# Patient Record
Sex: MALE | Race: BLACK OR AFRICAN AMERICAN | NOT HISPANIC OR LATINO | URBAN - METROPOLITAN AREA
[De-identification: names, ages, dates, MRNs, and addresses within clinical notes are randomized per-mention and may not be internally consistent; named-entity substitution may affect disease eponyms.]

---

## 2018-08-28 ENCOUNTER — EMERGENCY (EMERGENCY)
Facility: HOSPITAL | Age: 48
LOS: 1 days | End: 2018-08-28
Payer: SELF-PAY

## 2018-08-28 PROCEDURE — 99284 EMERGENCY DEPT VISIT MOD MDM: CPT

## 2021-04-20 ENCOUNTER — APPOINTMENT (OUTPATIENT)
Dept: ULTRASOUND IMAGING | Facility: CLINIC | Age: 51
End: 2021-04-20
Payer: MEDICAID

## 2021-04-20 PROCEDURE — 76870 US EXAM SCROTUM: CPT

## 2022-11-17 ENCOUNTER — INPATIENT (INPATIENT)
Facility: HOSPITAL | Age: 52
LOS: 0 days | Discharge: HOME | DRG: 72 | End: 2022-11-17
Attending: STUDENT IN AN ORGANIZED HEALTH CARE EDUCATION/TRAINING PROGRAM | Admitting: STUDENT IN AN ORGANIZED HEALTH CARE EDUCATION/TRAINING PROGRAM
Payer: MEDICAID

## 2022-11-17 VITALS
HEART RATE: 98 BPM | SYSTOLIC BLOOD PRESSURE: 160 MMHG | OXYGEN SATURATION: 97 % | DIASTOLIC BLOOD PRESSURE: 84 MMHG | RESPIRATION RATE: 18 BRPM | TEMPERATURE: 99 F

## 2022-11-17 VITALS
TEMPERATURE: 99 F | WEIGHT: 184.97 LBS | SYSTOLIC BLOOD PRESSURE: 159 MMHG | RESPIRATION RATE: 20 BRPM | HEART RATE: 103 BPM | OXYGEN SATURATION: 99 % | DIASTOLIC BLOOD PRESSURE: 60 MMHG

## 2022-11-17 DIAGNOSIS — R56.9 UNSPECIFIED CONVULSIONS: ICD-10-CM

## 2022-11-17 LAB
ALBUMIN SERPL ELPH-MCNC: 4.3 G/DL — SIGNIFICANT CHANGE UP (ref 3.3–5.2)
ALP SERPL-CCNC: 87 U/L — SIGNIFICANT CHANGE UP (ref 40–120)
ALT FLD-CCNC: 147 U/L — HIGH
ANION GAP SERPL CALC-SCNC: 19 MMOL/L — HIGH (ref 5–17)
ANISOCYTOSIS BLD QL: SLIGHT — SIGNIFICANT CHANGE UP
APTT BLD: 32 SEC — SIGNIFICANT CHANGE UP (ref 27.5–35.5)
AST SERPL-CCNC: 259 U/L — HIGH
BASOPHILS # BLD AUTO: 0.16 K/UL — SIGNIFICANT CHANGE UP (ref 0–0.2)
BASOPHILS NFR BLD AUTO: 2.6 % — HIGH (ref 0–2)
BILIRUB SERPL-MCNC: 0.6 MG/DL — SIGNIFICANT CHANGE UP (ref 0.4–2)
BLD GP AB SCN SERPL QL: SIGNIFICANT CHANGE UP
BUN SERPL-MCNC: 7.9 MG/DL — LOW (ref 8–20)
CALCIUM SERPL-MCNC: 9.1 MG/DL — SIGNIFICANT CHANGE UP (ref 8.4–10.5)
CHLORIDE SERPL-SCNC: 96 MMOL/L — SIGNIFICANT CHANGE UP (ref 96–108)
CO2 SERPL-SCNC: 23 MMOL/L — SIGNIFICANT CHANGE UP (ref 22–29)
CREAT SERPL-MCNC: 0.56 MG/DL — SIGNIFICANT CHANGE UP (ref 0.5–1.3)
EGFR: 119 ML/MIN/1.73M2 — SIGNIFICANT CHANGE UP
EOSINOPHIL # BLD AUTO: 0 K/UL — SIGNIFICANT CHANGE UP (ref 0–0.5)
EOSINOPHIL NFR BLD AUTO: 0 % — SIGNIFICANT CHANGE UP (ref 0–6)
FLUAV H3 RNA SPEC QL NAA+PROBE: DETECTED
FLUAV SUBTYP SPEC NAA+PROBE: DETECTED
GIANT PLATELETS BLD QL SMEAR: PRESENT — SIGNIFICANT CHANGE UP
GLUCOSE SERPL-MCNC: 97 MG/DL — SIGNIFICANT CHANGE UP (ref 70–99)
HCT VFR BLD CALC: 41.9 % — SIGNIFICANT CHANGE UP (ref 39–50)
HGB BLD-MCNC: 14.6 G/DL — SIGNIFICANT CHANGE UP (ref 13–17)
INR BLD: 1.32 RATIO — HIGH (ref 0.88–1.16)
LYMPHOCYTES # BLD AUTO: 0.49 K/UL — LOW (ref 1–3.3)
LYMPHOCYTES # BLD AUTO: 7.8 % — LOW (ref 13–44)
MACROCYTES BLD QL: SLIGHT — SIGNIFICANT CHANGE UP
MANUAL SMEAR VERIFICATION: SIGNIFICANT CHANGE UP
MCHC RBC-ENTMCNC: 32.2 PG — SIGNIFICANT CHANGE UP (ref 27–34)
MCHC RBC-ENTMCNC: 34.8 GM/DL — SIGNIFICANT CHANGE UP (ref 32–36)
MCV RBC AUTO: 92.5 FL — SIGNIFICANT CHANGE UP (ref 80–100)
MONOCYTES # BLD AUTO: 1.31 K/UL — HIGH (ref 0–0.9)
MONOCYTES NFR BLD AUTO: 20.9 % — HIGH (ref 2–14)
NEUTROPHILS # BLD AUTO: 3.87 K/UL — SIGNIFICANT CHANGE UP (ref 1.8–7.4)
NEUTROPHILS NFR BLD AUTO: 60 % — SIGNIFICANT CHANGE UP (ref 43–77)
NEUTS BAND # BLD: 1.7 % — SIGNIFICANT CHANGE UP (ref 0–8)
PLAT MORPH BLD: NORMAL — SIGNIFICANT CHANGE UP
PLATELET # BLD AUTO: 183 K/UL — SIGNIFICANT CHANGE UP (ref 150–400)
POTASSIUM SERPL-MCNC: 3.7 MMOL/L — SIGNIFICANT CHANGE UP (ref 3.5–5.3)
POTASSIUM SERPL-SCNC: 3.7 MMOL/L — SIGNIFICANT CHANGE UP (ref 3.5–5.3)
PROT SERPL-MCNC: 8.2 G/DL — SIGNIFICANT CHANGE UP (ref 6.6–8.7)
PROTHROM AB SERPL-ACNC: 15.3 SEC — HIGH (ref 10.5–13.4)
RAPID RVP RESULT: DETECTED
RBC # BLD: 4.53 M/UL — SIGNIFICANT CHANGE UP (ref 4.2–5.8)
RBC # FLD: 11.4 % — SIGNIFICANT CHANGE UP (ref 10.3–14.5)
RBC BLD AUTO: ABNORMAL
SARS-COV-2 RNA SPEC QL NAA+PROBE: SIGNIFICANT CHANGE UP
SMUDGE CELLS # BLD: PRESENT — SIGNIFICANT CHANGE UP
SODIUM SERPL-SCNC: 138 MMOL/L — SIGNIFICANT CHANGE UP (ref 135–145)
VARIANT LYMPHS # BLD: 7 % — HIGH (ref 0–6)
WBC # BLD: 6.28 K/UL — SIGNIFICANT CHANGE UP (ref 3.8–10.5)
WBC # FLD AUTO: 6.28 K/UL — SIGNIFICANT CHANGE UP (ref 3.8–10.5)

## 2022-11-17 PROCEDURE — G1004: CPT

## 2022-11-17 PROCEDURE — 70450 CT HEAD/BRAIN W/O DYE: CPT | Mod: MG

## 2022-11-17 PROCEDURE — 93010 ELECTROCARDIOGRAM REPORT: CPT

## 2022-11-17 PROCEDURE — 86900 BLOOD TYPING SEROLOGIC ABO: CPT

## 2022-11-17 PROCEDURE — 93005 ELECTROCARDIOGRAM TRACING: CPT

## 2022-11-17 PROCEDURE — 86901 BLOOD TYPING SEROLOGIC RH(D): CPT

## 2022-11-17 PROCEDURE — 70450 CT HEAD/BRAIN W/O DYE: CPT | Mod: 26,76,MG

## 2022-11-17 PROCEDURE — 80053 COMPREHEN METABOLIC PANEL: CPT

## 2022-11-17 PROCEDURE — 0225U NFCT DS DNA&RNA 21 SARSCOV2: CPT

## 2022-11-17 PROCEDURE — 85610 PROTHROMBIN TIME: CPT

## 2022-11-17 PROCEDURE — 96374 THER/PROPH/DIAG INJ IV PUSH: CPT

## 2022-11-17 PROCEDURE — 95714 VEEG EA 12-26 HR UNMNTR: CPT

## 2022-11-17 PROCEDURE — 99223 1ST HOSP IP/OBS HIGH 75: CPT

## 2022-11-17 PROCEDURE — 99285 EMERGENCY DEPT VISIT HI MDM: CPT

## 2022-11-17 PROCEDURE — 85730 THROMBOPLASTIN TIME PARTIAL: CPT

## 2022-11-17 PROCEDURE — 86850 RBC ANTIBODY SCREEN: CPT

## 2022-11-17 PROCEDURE — 99222 1ST HOSP IP/OBS MODERATE 55: CPT

## 2022-11-17 PROCEDURE — 36415 COLL VENOUS BLD VENIPUNCTURE: CPT

## 2022-11-17 PROCEDURE — 85025 COMPLETE CBC W/AUTO DIFF WBC: CPT

## 2022-11-17 RX ORDER — ACETAMINOPHEN 500 MG
650 TABLET ORAL EVERY 6 HOURS
Refills: 0 | Status: DISCONTINUED | OUTPATIENT
Start: 2022-11-17 | End: 2022-11-17

## 2022-11-17 RX ORDER — ALBUTEROL 90 UG/1
2 AEROSOL, METERED ORAL
Qty: 0 | Refills: 0 | DISCHARGE

## 2022-11-17 RX ORDER — MIDAZOLAM HYDROCHLORIDE 1 MG/ML
5 INJECTION, SOLUTION INTRAMUSCULAR; INTRAVENOUS ONCE
Refills: 0 | Status: DISCONTINUED | OUTPATIENT
Start: 2022-11-17 | End: 2022-11-17

## 2022-11-17 RX ORDER — IPRATROPIUM/ALBUTEROL SULFATE 18-103MCG
3 AEROSOL WITH ADAPTER (GRAM) INHALATION EVERY 6 HOURS
Refills: 0 | Status: DISCONTINUED | OUTPATIENT
Start: 2022-11-17 | End: 2022-11-17

## 2022-11-17 RX ORDER — FOLIC ACID 0.8 MG
1 TABLET ORAL DAILY
Refills: 0 | Status: DISCONTINUED | OUTPATIENT
Start: 2022-11-17 | End: 2022-11-17

## 2022-11-17 RX ORDER — ONDANSETRON 8 MG/1
4 TABLET, FILM COATED ORAL EVERY 8 HOURS
Refills: 0 | Status: DISCONTINUED | OUTPATIENT
Start: 2022-11-17 | End: 2022-11-17

## 2022-11-17 RX ORDER — LEVETIRACETAM 250 MG/1
500 TABLET, FILM COATED ORAL
Refills: 0 | Status: DISCONTINUED | OUTPATIENT
Start: 2022-11-17 | End: 2022-11-17

## 2022-11-17 RX ORDER — BNT162B2 ORIGINAL AND OMICRON BA.4/BA.5 .1125; .1125 MG/2.25ML; MG/2.25ML
0.3 INJECTION, SUSPENSION INTRAMUSCULAR ONCE
Refills: 0 | Status: DISCONTINUED | OUTPATIENT
Start: 2022-11-17 | End: 2022-11-17

## 2022-11-17 RX ORDER — THIAMINE MONONITRATE (VIT B1) 100 MG
100 TABLET ORAL DAILY
Refills: 0 | Status: DISCONTINUED | OUTPATIENT
Start: 2022-11-17 | End: 2022-11-17

## 2022-11-17 RX ORDER — LANOLIN ALCOHOL/MO/W.PET/CERES
3 CREAM (GRAM) TOPICAL AT BEDTIME
Refills: 0 | Status: DISCONTINUED | OUTPATIENT
Start: 2022-11-17 | End: 2022-11-17

## 2022-11-17 RX ADMIN — Medication 1 TABLET(S): at 13:25

## 2022-11-17 RX ADMIN — Medication 100 MILLIGRAM(S): at 13:25

## 2022-11-17 RX ADMIN — Medication 1 MILLIGRAM(S): at 13:25

## 2022-11-17 RX ADMIN — Medication 2 MILLIGRAM(S): at 09:05

## 2022-11-17 RX ADMIN — MIDAZOLAM HYDROCHLORIDE 5 MILLIGRAM(S): 1 INJECTION, SOLUTION INTRAMUSCULAR; INTRAVENOUS at 06:58

## 2022-11-17 NOTE — ED ADULT NURSE REASSESSMENT NOTE - NS ED NURSE REASSESS COMMENT FT1
Report received from offgoing RN, charting as noted. Patient given 2mg ativan for alcohol withdrawal, vitals are stable. pt w/ moderate tremors. report given to [facility] RN.

## 2022-11-17 NOTE — H&P ADULT - HISTORY OF PRESENT ILLNESS
53yo M w/ hx of Asthma and alcohol abuse presents as transfer for brain lesions on CT. Reports he was watching tv with girlfriend tonight, had episode of coughing, "shakes", and unresponsiveness witnessed by the girlfriend, he does not remember it. No h/o seizures. Reports he drinks 4-6 beers/day, more on weekends. Last drink 10pm last night. Denies cigs, drugs. Tremors on arrival to [facility] ED, controlled w/ Versed and ativan. Denies HA, CP, SOB, Abd pain, NVD, fevers, rashes.    FHx: No reported family hx in first degree relatives

## 2022-11-17 NOTE — ED PROVIDER NOTE - CLINICAL SUMMARY MEDICAL DECISION MAKING FREE TEXT BOX
51yo M denies pmh presents as transfer for brain lesions on CT. Labs pending. Neurosurgery consulted.

## 2022-11-17 NOTE — CONSULT NOTE ADULT - SUBJECTIVE AND OBJECTIVE BOX
52y old  Male transferred from [place] for evaluation of intracranial hyperdensity.  Patient reports while watching TV yesterday evening he had episode of coughing / shakes and unresponsiveness witnessed by his girlfriend.  No prior episodes and no hx of seizures disorder.    Patient reports drinking 4-6 beers per night and was drinking last evening as well.        PAST MEDICAL & SURGICAL HISTORY:  None     FAMILY HISTORY:  No hx of seizures or brain lesions     SOCIAL HISTORY:  Tobacco Use: Denies   EtOH use: 4-6 beers per day   Substance: Denies     Allergies    No Known Allergies    Intolerances        REVIEW OF SYSTEMS  Negative except as noted in HPI    Vital Signs Last 24 Hrs  T(C): 37.4 (17 Nov 2022 06:32), Max: 37.4 (17 Nov 2022 06:32)  T(F): 99.3 (17 Nov 2022 06:32), Max: 99.3 (17 Nov 2022 06:32)  HR: 93 (17 Nov 2022 07:18) (93 - 106)  BP: 144/79 (17 Nov 2022 07:18) (144/79 - 169/91)  BP(mean): --  RR: 19 (17 Nov 2022 07:18) (19 - 23)  SpO2: 96% (17 Nov 2022 07:18) (94% - 99%)    Parameters below as of 17 Nov 2022 07:18  Patient On (Oxygen Delivery Method): room air          PHYSICAL EXAM:  GENERAL: NAD, well-groomed, well-developed  HEAD:  Atraumatic, normocephalic  Awake, alert and oriented x3, speech clear and fluent, face symmetric  THAKKAR x4 with 5/5 strength   following commands briskly   SENSATION: grossly intact to light touch all extremities  CHEST/LUNG: Clear to auscultation bilaterally  HEART: +S1/+S2; Regular rate and rhythm  ABDOMEN: Soft, nontender, nondistended  EXTREMITIES:  2+ peripheral pulses  SKIN: Warm, dry    LABS:                        14.6   6.28  )-----------( 183      ( 17 Nov 2022 06:50 )             41.9     11-17    138  |  96  |  7.9<L>  ----------------------------<  97  3.7   |  23.0  |  0.56    Ca    9.1      17 Nov 2022 06:50    TPro  8.2  /  Alb  4.3  /  TBili  0.6  /  DBili  x   /  AST  259<H>  /  ALT  147<H>  /  AlkPhos  87  11-17    PT/INR - ( 17 Nov 2022 06:50 )   PT: 15.3 sec;   INR: 1.32 ratio         PTT - ( 17 Nov 2022 06:50 )  PTT:32.0 sec          RADIOLOGY & ADDITIONAL STUDIES:  CTH: small left parietal hyperdensity     CAPRINI SCORE [CLOT]:  Patient has an estimated Caprini score of greater than 5.  However, the patient's unique clinical situation will be addressed in an individual manner to determine appropriate anticoagulation treatment, if any.

## 2022-11-17 NOTE — CHART NOTE - NSCHARTNOTEFT_GEN_A_CORE
Called by RN due to Patient wanting to sign out AMA. Asked Patient why he wanted to leave, reports "No tests have been done since I've gotten here!"    Patient is alert and oriented x3 and has demonstrated capacity to make decisions. I explained at length to the Patient the outcomes of leaving AMA, including worsening of their condition, becoming permanently disabled/in pain/critically ill, or death. I explained the risks, benefits and alternatives to treatment as well as the risks of refusing treatment at this time. The patient was told that the hospital evaluation is incomplete. Despite these efforts, we were unable to convince the pt to stay. I offered to answer any questions and fully addressed concerns and answered any such questions. Patient fully understands what has been explained and answered all questions. Attending aware of above. We’ve made  numerous efforts to prevent the pt from leaving AMA. Patient signed form to sign out AMA and accepts responsibility for any and all results of this decision. The importance of returning to the ED/Hospital if medical condition worsen was emphasized at length.

## 2022-11-17 NOTE — ED ADULT NURSE NOTE - OBJECTIVE STATEMENT
Patient transferred from [facility] for further Neuro work up. Patient states he remembers watching TV with his girlfriend and must of fell asleep, next he remembers being woken up from coughing, sweating, and his girlfriend trying to get his attention. Patient states his girlfriend stated he looked like he was seizing and called 911. Patient is AxOx4. NIH 0. GCS 15. Patient states he is a chronic beer drinker (5-6/day). Lab sent. MD Juárez at bedside. Patient placed in yellow gown, belongings secured. CIWA initiated at this time. Patient transferred from [facility] for further Neuro work up. Patient states he remembers watching TV with his girlfriend and must of fell asleep, next he remembers being woken up from coughing, sweating, and his girlfriend trying to get his attention. Patient states his girlfriend stated he looked like he was seizing and called 911. Patient is AxOx4. NIH 0. GCS 15. Patient states he is a chronic beer drinker (5-6/day). Lab sent. MD Juárez at bedside. Patient on cardiac monitor, NSR noted.  Patient placed in yellow gown, belongings secured. CIWA initiated at this time.

## 2022-11-17 NOTE — ED PROVIDER NOTE - NS ED ROS FT
Constitutional: no fever, no sweats, and no chills.  CV: no chest pain, no edema.  Resp: no cough, no dyspnea  GI: no abdominal pain, +nausea and +vomiting.  MSK: no msk pain, no weakness  Skin: no lesions, and no rashes.  Neuro: +LOC, no headache, no dizziness, +tremor  ROS otherwise negative except as noted in HPI.

## 2022-11-17 NOTE — CONSULT NOTE ADULT - NS ATTEND AMEND GEN_ALL_CORE FT
NSGY Attg:    see above    patient seen and examined    agree with exam and plan as above  MRI brain w and wo contrast pending  recommend neurology consult/work-up for new onset seizure

## 2022-11-17 NOTE — PATIENT PROFILE ADULT - FALL HARM RISK - HARM RISK INTERVENTIONS

## 2022-11-17 NOTE — H&P ADULT - ASSESSMENT
53 y/o M w/ a hx of asthma and ETOH abuse presents for mgmt of ?Brain Mass    #?Brain mass vs microhemorrhage  From [facility], CT findings as above  ?Seizure prior to [facility] visit  NSGY on board  - Keppra 500mg Q12  - Obtain MRI W/WO contrast of brain  - Tele/ monitoring  - Neuro checks Q4  - Ativan PRN   - Neuro consult placed    #ETOH Abuse  6 beers a day  tremulous on admission  resolved w/ ativan  - Ativan Q4 PRN for CIWA >8  - CIWA protocol initiated  - Start standing ativan if needed  - MVT/Thiamine/Folic Acid  - Tele monitoring  - Education on alcohol cessation    #Asthma  normally controlled w/ Daily albuterol  - duonebs Q6  - declining symbicort at this time    #Transaminitis likely 2/2 ALD  AST/ALT ratio 2:1  - obtain Hepatitis panel  - obtain US liver  - caution w/ hepatotoxic agents    #Healthcare Maintenance  DVT PPx - SCDs  Diet - Regular  Dispo - Acutely Sick

## 2022-11-17 NOTE — ED PROVIDER NOTE - PHYSICAL EXAMINATION
General: well appearing, NAD  Head: NC/AT  Cardiac: RRR, no m/r/g  Respiratory: CTABL, equal chest wall expansions, no conversational dyspnea  Abdomen: soft, ND, NT  Neuro: AAOx3, coordinated movement of all 4 extremities, +tremor on arm extension  Psych: calm, cooperative, normal affect  Skin: warm and dry

## 2022-11-17 NOTE — ED PROVIDER NOTE - CARE PLAN
1 Principal Discharge DX:	Seizure  Secondary Diagnosis:	Alcohol withdrawal  Secondary Diagnosis:	Hemorrhage in the brain

## 2022-11-17 NOTE — H&P ADULT - NSHPLABSRESULTS_GEN_ALL_CORE
LABS:                         14.6   6.28  )-----------( 183      ( 17 Nov 2022 06:50 )             41.9     11-17    138  |  96  |  7.9<L>  ----------------------------<  97  3.7   |  23.0  |  0.56    Ca    9.1      17 Nov 2022 06:50    TPro  8.2  /  Alb  4.3  /  TBili  0.6  /  DBili  x   /  AST  259<H>  /  ALT  147<H>  /  AlkPhos  87  11-17    PT/INR - ( 17 Nov 2022 06:50 )   PT: 15.3 sec;   INR: 1.32 ratio         PTT - ( 17 Nov 2022 06:50 )  PTT:32.0 sec              RADIOLOGY, EKG & ADDITIONAL TESTS:    CT H -  Stable, nonspecific 5 mm focus of increased density along left parietal   concavity which may represent a focus of microhemorrhage versus   extra-axial mass such as a tiny meningioma.

## 2022-11-17 NOTE — ED PROVIDER NOTE - IV ALTEPLASE DOOR HIDDEN
show
How to get your Coronavirus (COVID-19) Testing Results:   Please be advised that you were tested for the coronavirus (COVID-19) in the Emergency Department at [facility].  You are to maintain self-quarantine procedures for 14 days until instructed otherwise by one of our healthcare agents. Please note that the test may take up to 2-4 days to result.  If you do not hear from us within 72 hours and you'd like to check on your results, you can call on of our coronavirus specialists at [address] [place] [address] (available 24/7).  Please DO NOT call the site where you received the test to obtain your results.

## 2022-11-17 NOTE — ED ADULT TRIAGE NOTE - CHIEF COMPLAINT QUOTE
Transfer from [facility] for seizure activity. CT was done and found brain lesions. PT transferred for neuro consult. GCS15 at this time. Denies pain. PT is an everyday drinker and drinks multiple beers a night. Only had one beer last night. Very tremulous on arrival. PT tested positive for Flu A.

## 2022-11-17 NOTE — ED ADULT NURSE NOTE - CHIEF COMPLAINT QUOTE
Transfer from [facility] for seizure activity. CT was done and found brain lesions. PT transferred for neuro consult. GCS15 at this time. Denies pain. PT is an everyday drinker and drinks multiple beers a night. Only had one beer last night. Very tremulous on arrival. PT tested positive for Flu A.

## 2022-11-17 NOTE — CONSULT NOTE ADULT - ASSESSMENT
53 y/o male presented w/ new onset seizure and small hyperdensity in left parietal lobe   - CTH reviewed and stable   - MRI +/- contrast of brain to assess for underlying lesion and cause of new onset seizures   - Keppra 500 BID   - neurology evaluation for seizure w/u and control

## 2022-11-17 NOTE — ED PROVIDER NOTE - OBJECTIVE STATEMENT
53yo M denies pmh presents as transfer for brain lesions on CT. Reports he was watching tv with girlfriend tonight, had episode of coughing, "shakes", and unresponsiveness witnessed by the girlfriend, he does not remember it. No h/o seizures. Reports he drinks 4-6 beers/day, more on weekends. Last drink 10pm last night. Denies cigs, drugs. Now c/o nausea, tremor.

## 2022-11-17 NOTE — ED PROVIDER NOTE - PROGRESS NOTE DETAILS
hemodynamically stable  mild tremors, ciwa 9  d/w neurosurgery who recommends admission to medical service  d'/w hospitalist Dr. Dillard who accepts admit  -md vinny

## 2022-11-17 NOTE — ED PROVIDER NOTE - ATTENDING CONTRIBUTION TO CARE
patient with hx alcohol abuse with possible seizure  patient [place] [facility] and had ct head hsowing microhemorrhage and trasnferred for neurosurgical evaluation  patient awake and alert  mild tremors, nausea  ciwa 9  cta b/l, [id number]  abd soft non tender  5/5 UE and LE b/l    labs, repeat ct  benzos

## 2022-11-18 ENCOUNTER — TRANSCRIPTION ENCOUNTER (OUTPATIENT)
Age: 52
End: 2022-11-18

## 2022-11-18 NOTE — DISCHARGE NOTE PROVIDER - HOSPITAL COURSE
51yo M w/ hx of Asthma and alcohol abuse presents as transfer for brain lesions on CT. Reports he was watching tv with girlfriend tonight, had episode of coughing, "shakes", and unresponsiveness witnessed by the girlfriend, he does not remember it. No h/o seizures. Reports he drinks 4-6 beers/day, more on weekends. Last drink 10pm last night. Denies cigs, drugs. Tremors on arrival to [facility] ED, controlled w/ Versed and ativan. Denies HA, CP, SOB, Abd pain, NVD, fevers, rashes.    Pt ama'd. See chart note for full AMA discussion.

## 2022-11-29 PROBLEM — Z00.00 ENCOUNTER FOR PREVENTIVE HEALTH EXAMINATION: Status: ACTIVE | Noted: 2022-11-29

## 2022-12-01 ENCOUNTER — APPOINTMENT (OUTPATIENT)
Dept: NEUROSURGERY | Facility: CLINIC | Age: 52
End: 2022-12-01

## 2024-07-24 NOTE — ED ADULT NURSE NOTE - NS ED NURSE RECORD ANOTHER HT AND WT
Patience is here today for   Chief Complaint   Patient presents with    Medicare Wellness Visit    Follow-up       Medication Refills needed today?  NO,   if you receive a prescription today would you like it to be sent to [facility]? NO    Medications: medications verified and updated    Patient would like communication of their results via:    Cell Phone:   Telephone Information:   Mobile [phone]     Okay to leave a message containing results? Yes    Tobacco history: verified         Health Maintenance       COVID-19 Vaccine (1 - 2023-24 season)  Never done    Traditional Medicare- Medicare Wellness Visit (Yearly)  Overdue since 11/1/2023           Following review of the above:  Patient is not proceeding with: COVID-19    Note: Refer to final orders and clinician documentation.            COVID-19 Vaccine Interest Assessment:   Not interested in receiving COVID-19 vaccine.  If the patient reports an outside immunization, please update the WIR/ICARE information in the Immunizations activity          Yes